# Patient Record
Sex: FEMALE | Race: BLACK OR AFRICAN AMERICAN | NOT HISPANIC OR LATINO | URBAN - METROPOLITAN AREA
[De-identification: names, ages, dates, MRNs, and addresses within clinical notes are randomized per-mention and may not be internally consistent; named-entity substitution may affect disease eponyms.]

---

## 2021-11-24 ENCOUNTER — EMERGENCY (EMERGENCY)
Facility: HOSPITAL | Age: 41
LOS: 0 days | Discharge: HOME | End: 2021-11-24
Attending: EMERGENCY MEDICINE
Payer: MEDICAID

## 2021-11-24 VITALS
HEART RATE: 102 BPM | TEMPERATURE: 98 F | OXYGEN SATURATION: 99 % | DIASTOLIC BLOOD PRESSURE: 91 MMHG | RESPIRATION RATE: 18 BRPM | HEIGHT: 59 IN | SYSTOLIC BLOOD PRESSURE: 140 MMHG | WEIGHT: 108.03 LBS

## 2021-11-24 DIAGNOSIS — H53.8 OTHER VISUAL DISTURBANCES: ICD-10-CM

## 2021-11-24 DIAGNOSIS — R51.9 HEADACHE, UNSPECIFIED: ICD-10-CM

## 2021-11-24 DIAGNOSIS — M79.671 PAIN IN RIGHT FOOT: ICD-10-CM

## 2021-11-24 DIAGNOSIS — I10 ESSENTIAL (PRIMARY) HYPERTENSION: ICD-10-CM

## 2021-11-24 DIAGNOSIS — Z59.00 HOMELESSNESS UNSPECIFIED: ICD-10-CM

## 2021-11-24 DIAGNOSIS — M79.672 PAIN IN LEFT FOOT: ICD-10-CM

## 2021-11-24 PROCEDURE — 99283 EMERGENCY DEPT VISIT LOW MDM: CPT

## 2021-11-24 PROCEDURE — 99284 EMERGENCY DEPT VISIT MOD MDM: CPT

## 2021-11-24 RX ORDER — IBUPROFEN 200 MG
600 TABLET ORAL ONCE
Refills: 0 | Status: COMPLETED | OUTPATIENT
Start: 2021-11-24 | End: 2021-11-24

## 2021-11-24 RX ORDER — ACETAMINOPHEN 500 MG
1000 TABLET ORAL ONCE
Refills: 0 | Status: COMPLETED | OUTPATIENT
Start: 2021-11-24 | End: 2021-11-24

## 2021-11-24 RX ADMIN — Medication 600 MILLIGRAM(S): at 12:47

## 2021-11-24 RX ADMIN — Medication 1000 MILLIGRAM(S): at 12:47

## 2021-11-24 SDOH — ECONOMIC STABILITY - HOUSING INSECURITY: HOMELESSNESS UNSPECIFIED: Z59.00
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
## 2021-11-24 NOTE — ED STATDOCS - PROGRESS NOTE DETAILS
42 y/o F with PMH of HTN, asthma presents with HA and "not feeling well." Pt resides in shelter, left yesterday and started feeling HA while waiting for her  outside. Did not take medications for headache. Admits to blurry vision, but states she is supposed to wear glasses. Denies fever, chills, head trauma, known sick contacts. nausea, vomiting. +lightheadedness. PE: Well appearing. HEENT; PERRLA, EOMI. Cardiac: s1s2, RRR. Lungs: CTAB. Abdomen: NBS x4, soft, nontender. Neuro; CN ii-xii intact. sensation intact to light touch in all extremities. 5/5 strength in all extremities. romberg; negative. A/P; 1)headache, will provide motrin & tylenol. 2) homelessness. will d/w social work. - Jean Carlos Scott PA-C SW arranged for placement at [facility]. - Jean Carlos Scott PA-C

## 2021-11-24 NOTE — ED STATDOCS - PATIENT PORTAL LINK FT
You can access the FollowMyHealth Patient Portal offered by [facility] by registering at the following website: [email or web address]. By joining [employer]’s FollowMyHealth portal, you will also be able to view your health information using other applications (apps) compatible with our system.

## 2021-11-24 NOTE — ED STATDOCS - CLINICAL SUMMARY MEDICAL DECISION MAKING FREE TEXT BOX
will provide Tylenol discuss with social work for housing placement and dc home. no red flags for dangerous cause of HA. suspect pt is here more so for housing issues.

## 2021-11-24 NOTE — ED ADULT TRIAGE NOTE - TEMPERATURE IN FAHRENHEIT (DEGREES F)
Left VM for pt to call back with her BG readings. She never faxed them over and hasn't returned call. Will stop following at this point.   97.7

## 2021-11-24 NOTE — ED ADULT NURSE NOTE - CAS DISCH CONDITION
"This is a 30-year-old who returns to follow-up on left first great toe pain.  She continues to wear sandals does report that the pain has diminished.  She is interested to get results from x-ray and lab.  She says that she does have running shoes and apparently also has orthotic inserts although is not convincing that she is necessarily using these as had been recommended last week.    When asked if she has any other concerns she reports that she has a tender lump on her scalp.  She had a previous similar lesion on her scalp when she lived in [place] and this was drained and that solved the problem.  She does not notice any discharge from it but says it is tender when pressed upon.    Objective:  BP 94/62  Pulse 59  Temp 98  F (36.7  C)  Resp 18  Ht 4' 11\" (149.9 cm)  Wt 139 lb 9.6 oz (63.3 kg)  LMP 05/22/2018 (Exact Date)  BMI 28.2 kg/m2  Her vitals are satisfactory.  We reviewed her labs and radiology report and communicated the findings to her.  On palpation of her scalp she has a small tender one problem approximately one half dime sized mobile firm smooth symmetric lump which is tender.  Clinically this appears to be a sebaceous cyst.  She also identifies an area where a similar lesion had been presently where what remains appears to be scar.    Sampson was seen today for recheck.    Diagnoses and all orders for this visit:    Pain of toe of left foot    Sebaceous cyst      I advised her that there is no obvious cause of concern for her left toe pain and recommended wearing supportive footwear and inserts if she is standing or walking a lot.    Concerning the presumed sebaceous cyst I advised her this could be excised which would be both therapeutic and diagnostic to confirm its nature.  On exam it does not appear to be fixed to the skull and I can verify that it is mobile and moves over the skull and therefore should be amenable to sending for pathology.  She will consider whether she wants to pursue this or " not.  In the interim she will use ibuprofen as needed pain.    Total visit time was 25 mins, all of which was face to face MD time, and over 50% of this time was spent in counseling and coordination of care.     Stable
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
## 2021-11-26 ENCOUNTER — EMERGENCY (EMERGENCY)
Facility: HOSPITAL | Age: 41
LOS: 1 days | Discharge: HOME | End: 2021-11-26
Attending: EMERGENCY MEDICINE
Payer: MEDICAID

## 2021-11-26 VITALS
RESPIRATION RATE: 17 BRPM | HEIGHT: 60 IN | HEART RATE: 97 BPM | OXYGEN SATURATION: 99 % | TEMPERATURE: 98 F | WEIGHT: 100.09 LBS | DIASTOLIC BLOOD PRESSURE: 96 MMHG | SYSTOLIC BLOOD PRESSURE: 144 MMHG

## 2021-11-26 PROCEDURE — 99284 EMERGENCY DEPT VISIT MOD MDM: CPT

## 2021-11-26 PROCEDURE — 99284 EMERGENCY DEPT VISIT MOD MDM: CPT | Mod: 25

## 2021-11-26 PROCEDURE — 82962 GLUCOSE BLOOD TEST: CPT

## 2021-11-26 PROCEDURE — 70450 CT HEAD/BRAIN W/O DYE: CPT | Mod: MD

## 2021-11-26 PROCEDURE — 70450 CT HEAD/BRAIN W/O DYE: CPT | Mod: 26,MD

## 2021-11-26 RX ORDER — ACETAMINOPHEN 500 MG
650 TABLET ORAL ONCE
Refills: 0 | Status: COMPLETED | OUTPATIENT
Start: 2021-11-26 | End: 2021-11-26

## 2021-11-26 RX ADMIN — Medication 650 MILLIGRAM(S): at 14:52

## 2021-11-26 RX ADMIN — Medication 650 MILLIGRAM(S): at 18:29

## 2021-11-26 NOTE — ED ADULT NURSE NOTE - OBJECTIVE STATEMENT
41y female c/o headache and numbness/tingling in fingertips. pt also voicing concerns of possibility of diabetes diagnosis. FS WNL. denies vision changes. respirations even and unlabored. denies chest discomfort. abd soft and nondistended, denies nausea/vomiting. skin WNL for race. pt requesting to speak with social work regarding housing issues.

## 2021-11-26 NOTE — ED PROVIDER NOTE - PATIENT PORTAL LINK FT
You can access the FollowMyHealth Patient Portal offered by [facility] by registering at the following website: [email or web address]. By joining [employer]’s FollowMyHealth portal, you will also be able to view your health information using other applications (apps) compatible with our system.

## 2021-11-26 NOTE — CHART NOTE - NSCHARTNOTEFT_GEN_A_CORE
ADELANotsebastián: pt ready for d/c .DSS placed pt at [facility] shelter  in [place]  ,worker called [facility] to request taxi ([employer] ) was told pt's transport. benefit inactive. Worker called DSS encouraged worker to call [facility] care supervisor to approve trip for pt . [facility] called again(Alfonso) supervisor requested . Spoke with supervisor Phyllis who states she is unable to assist given that the eligibility department is the one in charge of reactivation and they are not available until Monday am.

## 2021-11-26 NOTE — ED PROVIDER NOTE - CLINICAL SUMMARY MEDICAL DECISION MAKING FREE TEXT BOX
nontoxic appearing female no acute distress eating candy in ER reporting headache and requesting to speak with SW regarding housing. mildly hypertensive. reports compliance on medication. no focal neuro deficits. accucheck stable and eating in ED. SW made aware

## 2021-11-26 NOTE — ED PROVIDER NOTE - ATTENDING CONTRIBUTION TO CARE
The patient seen and examined.    Headache    I, Riki Oreilly, performed the initial face to face bedside interview with this patient regarding history of present illness, review of symptoms and relevant past medical, social and family history.  I completed an independent physical examination.  I was the initial provider who evaluated this patient. I have signed out the follow up of any pending tests (i.e. labs, radiological studies) to the ACP.  I have communicated the patient’s plan of care and disposition with the ACP.

## 2021-11-26 NOTE — ED PROVIDER NOTE - PHYSICAL EXAMINATION
Const: Awake, alert and oriented. In no acute distress. Well appearing.  HEENT: NC/AT. Moist mucous membranes.  Eyes: No scleral icterus. EOMI. finger to nose intake peripheral fields intact   Neck:. Soft and supple. Full ROM without pain.  Cardiac: Regular rate and regular rhythm. +S1/S2. No murmurs. Peripheral pulses 2+ and symmetric. No LE edema.  Resp: Speaking in full sentences. No evidence of respiratory distress. No wheezes, rales or rhonchi.  Abd: Soft, non-tender, non-distended. . No guarding or rebound.  Back: Spine midline and non-tender. No CVAT.  Skin: No rashes, abrasions or lacerations.  Lymph: No cervical lymphadenopathy.  Neuro: Awake, alert & oriented x 3. Moves all extremities symmetrically.  FEET: no open sores lesions abrasions lacerations or rash. hypersentitive to touch. FROM of bilateral toes ankle. ambulatory

## 2021-11-26 NOTE — ED PROVIDER NOTE - OBJECTIVE STATEMENT
42 yo female hx of asthma htn and peripheral neuropathy, presenting to the ED with headache no visual changes no head injury associated with some mild dizziness but states that she thinks its related to her not eating a well balanced meals, also reporting and pain in feet , but walking a lot more than usual. pt also requesting to speak with social work regarding housing issues. wants to be placed with her . reports that he helps her with her helath issues. pt states that she is "mostly good" at taking her medications.

## 2021-11-26 NOTE — ED PROVIDER NOTE - PROGRESS NOTE DETAILS
RBADY MCCARTNEY: tolerating po sour patch kids   no distress.   social work aware POLO- social made aware ct head is negative, will arrange for taxi

## 2021-12-23 ENCOUNTER — EMERGENCY (EMERGENCY)
Facility: HOSPITAL | Age: 41
LOS: 1 days | Discharge: HOME | End: 2021-12-23
Attending: EMERGENCY MEDICINE
Payer: MEDICAID

## 2021-12-23 VITALS
SYSTOLIC BLOOD PRESSURE: 148 MMHG | RESPIRATION RATE: 18 BRPM | OXYGEN SATURATION: 98 % | TEMPERATURE: 98 F | DIASTOLIC BLOOD PRESSURE: 99 MMHG | HEIGHT: 60 IN | WEIGHT: 115.08 LBS | HEART RATE: 87 BPM

## 2021-12-23 LAB
ALBUMIN SERPL ELPH-MCNC: 4.8 G/DL — SIGNIFICANT CHANGE UP (ref 3.3–5.2)
ALP SERPL-CCNC: 82 U/L — SIGNIFICANT CHANGE UP (ref 40–120)
ALT FLD-CCNC: 15 U/L — SIGNIFICANT CHANGE UP
ANION GAP SERPL CALC-SCNC: 16 MMOL/L — SIGNIFICANT CHANGE UP (ref 5–17)
APTT BLD: 30.5 SEC — SIGNIFICANT CHANGE UP (ref 27.5–35.5)
AST SERPL-CCNC: 21 U/L — SIGNIFICANT CHANGE UP
BASOPHILS # BLD AUTO: 0.11 K/UL — SIGNIFICANT CHANGE UP (ref 0–0.2)
BASOPHILS NFR BLD AUTO: 1.7 % — SIGNIFICANT CHANGE UP (ref 0–2)
BILIRUB SERPL-MCNC: 0.3 MG/DL — LOW (ref 0.4–2)
BUN SERPL-MCNC: 10.4 MG/DL — SIGNIFICANT CHANGE UP (ref 8–20)
CALCIUM SERPL-MCNC: 9.8 MG/DL — SIGNIFICANT CHANGE UP (ref 8.6–10.2)
CHLORIDE SERPL-SCNC: 108 MMOL/L — HIGH (ref 98–107)
CO2 SERPL-SCNC: 21 MMOL/L — LOW (ref 22–29)
CREAT SERPL-MCNC: 0.7 MG/DL — SIGNIFICANT CHANGE UP (ref 0.5–1.3)
EOSINOPHIL # BLD AUTO: 0.2 K/UL — SIGNIFICANT CHANGE UP (ref 0–0.5)
EOSINOPHIL NFR BLD AUTO: 3.1 % — SIGNIFICANT CHANGE UP (ref 0–6)
GLUCOSE SERPL-MCNC: 73 MG/DL — SIGNIFICANT CHANGE UP (ref 70–99)
HCG SERPL-ACNC: <4 MIU/ML — SIGNIFICANT CHANGE UP
HCT VFR BLD CALC: 38.9 % — SIGNIFICANT CHANGE UP (ref 34.5–45)
HGB BLD-MCNC: 12.3 G/DL — SIGNIFICANT CHANGE UP (ref 11.5–15.5)
IMM GRANULOCYTES NFR BLD AUTO: 0.3 % — SIGNIFICANT CHANGE UP (ref 0–1.5)
INR BLD: 1.05 RATIO — SIGNIFICANT CHANGE UP (ref 0.88–1.16)
LYMPHOCYTES # BLD AUTO: 2.3 K/UL — SIGNIFICANT CHANGE UP (ref 1–3.3)
LYMPHOCYTES # BLD AUTO: 35.3 % — SIGNIFICANT CHANGE UP (ref 13–44)
MAGNESIUM SERPL-MCNC: 2 MG/DL — SIGNIFICANT CHANGE UP (ref 1.6–2.6)
MCHC RBC-ENTMCNC: 26.8 PG — LOW (ref 27–34)
MCHC RBC-ENTMCNC: 31.6 GM/DL — LOW (ref 32–36)
MCV RBC AUTO: 84.7 FL — SIGNIFICANT CHANGE UP (ref 80–100)
MONOCYTES # BLD AUTO: 0.42 K/UL — SIGNIFICANT CHANGE UP (ref 0–0.9)
MONOCYTES NFR BLD AUTO: 6.5 % — SIGNIFICANT CHANGE UP (ref 2–14)
NEUTROPHILS # BLD AUTO: 3.46 K/UL — SIGNIFICANT CHANGE UP (ref 1.8–7.4)
NEUTROPHILS NFR BLD AUTO: 53.1 % — SIGNIFICANT CHANGE UP (ref 43–77)
PLATELET # BLD AUTO: 383 K/UL — SIGNIFICANT CHANGE UP (ref 150–400)
POTASSIUM SERPL-MCNC: 3.8 MMOL/L — SIGNIFICANT CHANGE UP (ref 3.5–5.3)
POTASSIUM SERPL-SCNC: 3.8 MMOL/L — SIGNIFICANT CHANGE UP (ref 3.5–5.3)
PROT SERPL-MCNC: 8.1 G/DL — SIGNIFICANT CHANGE UP (ref 6.6–8.7)
PROTHROM AB SERPL-ACNC: 12.1 SEC — SIGNIFICANT CHANGE UP (ref 10.6–13.6)
RBC # BLD: 4.59 M/UL — SIGNIFICANT CHANGE UP (ref 3.8–5.2)
RBC # FLD: 15.7 % — HIGH (ref 10.3–14.5)
SODIUM SERPL-SCNC: 145 MMOL/L — SIGNIFICANT CHANGE UP (ref 135–145)
TROPONIN T SERPL-MCNC: <0.01 NG/ML — SIGNIFICANT CHANGE UP (ref 0–0.06)
WBC # BLD: 6.51 K/UL — SIGNIFICANT CHANGE UP (ref 3.8–10.5)
WBC # FLD AUTO: 6.51 K/UL — SIGNIFICANT CHANGE UP (ref 3.8–10.5)

## 2021-12-23 PROCEDURE — 84702 CHORIONIC GONADOTROPIN TEST: CPT

## 2021-12-23 PROCEDURE — 85025 COMPLETE CBC W/AUTO DIFF WBC: CPT

## 2021-12-23 PROCEDURE — 80053 COMPREHEN METABOLIC PANEL: CPT

## 2021-12-23 PROCEDURE — 84484 ASSAY OF TROPONIN QUANT: CPT

## 2021-12-23 PROCEDURE — 71045 X-RAY EXAM CHEST 1 VIEW: CPT | Mod: 26

## 2021-12-23 PROCEDURE — 36415 COLL VENOUS BLD VENIPUNCTURE: CPT

## 2021-12-23 PROCEDURE — 93005 ELECTROCARDIOGRAM TRACING: CPT

## 2021-12-23 PROCEDURE — 99284 EMERGENCY DEPT VISIT MOD MDM: CPT | Mod: 25

## 2021-12-23 PROCEDURE — 99285 EMERGENCY DEPT VISIT HI MDM: CPT

## 2021-12-23 PROCEDURE — 71045 X-RAY EXAM CHEST 1 VIEW: CPT

## 2021-12-23 PROCEDURE — 93010 ELECTROCARDIOGRAM REPORT: CPT

## 2021-12-23 PROCEDURE — 83735 ASSAY OF MAGNESIUM: CPT

## 2021-12-23 PROCEDURE — 85610 PROTHROMBIN TIME: CPT

## 2021-12-23 PROCEDURE — 85730 THROMBOPLASTIN TIME PARTIAL: CPT

## 2021-12-23 PROCEDURE — 96374 THER/PROPH/DIAG INJ IV PUSH: CPT

## 2021-12-23 RX ORDER — KETOROLAC TROMETHAMINE 30 MG/ML
15 SYRINGE (ML) INJECTION ONCE
Refills: 0 | Status: DISCONTINUED | OUTPATIENT
Start: 2021-12-23 | End: 2021-12-23

## 2021-12-23 RX ORDER — GABAPENTIN 400 MG/1
1 CAPSULE ORAL
Qty: 30 | Refills: 0
Start: 2021-12-23 | End: 2022-01-06

## 2021-12-23 RX ORDER — GABAPENTIN 400 MG/1
300 CAPSULE ORAL ONCE
Refills: 0 | Status: COMPLETED | OUTPATIENT
Start: 2021-12-23 | End: 2021-12-23

## 2021-12-23 RX ADMIN — GABAPENTIN 300 MILLIGRAM(S): 400 CAPSULE ORAL at 18:48

## 2021-12-23 RX ADMIN — Medication 15 MILLIGRAM(S): at 18:48

## 2021-12-23 NOTE — ED ADULT NURSE NOTE - NSHOSCREENINGQ1_ED_ALL_ED
No Imiquimod Counseling:  I discussed with the patient the risks of imiquimod including but not limited to erythema, scaling, itching, weeping, crusting, and pain.  Patient understands that the inflammatory response to imiquimod is variable from person to person and was educated regarded proper titration schedule.  If flu-like symptoms develop, patient knows to discontinue the medication and contact us.

## 2021-12-23 NOTE — ED ADULT NURSE REASSESSMENT NOTE - NS ED NURSE REASSESS COMMENT FT1
Pt with no complaints for RN at this time Denies any chest pain, shortness of breath, nausea or dizziness pt educated on plan of care, pt able to successfully teach back plan of care to RN, RN will continue to reeducate pt during hospital stay.

## 2021-12-23 NOTE — ED PROVIDER NOTE - PATIENT PORTAL LINK FT
You can access the FollowMyHealth Patient Portal offered by [facility] by registering at the following website: [email or web address]. By joining [employer]’s FollowMyHealth portal, you will also be able to view your health information using other applications (apps) compatible with our system.

## 2021-12-23 NOTE — ED PROVIDER NOTE - OBJECTIVE STATEMENT
41 year old female with PMH peripheral neuropath presents with diffuse pain. Pt states that the pain is burning, wors ein b/l hands and feet but diffuse, includin in her chest, not related to exertion but worse with touching. No OB, cough, fevers, chills.

## 2021-12-23 NOTE — ED ADULT NURSE NOTE - OBJECTIVE STATEMENT
Pt A&Ox4, NAD. Pt presents to the ED with complaints of CP. Breathing even and unlabored. Skin warm, dry, color appropriate to race. Pt placed on CM. ED workup in progress.

## 2021-12-24 NOTE — ED POST DISCHARGE NOTE - ADDITIONAL DOCUMENTATION
BRADY Kramer contacted by Radiology that pt has complicated Xray read and neds return to revelaution, Pt  contacted states that pt does not have a phone and does not have a address for her at this time he states that he is able to contact her he was informed to tell her to return to the ED.

## 2021-12-24 NOTE — ED POST DISCHARGE NOTE - DETAILS
Pt informed of complicated condition and need to return to the ED for further evaluation to eval for possible life and limb threatening conditions,

## 2022-01-04 ENCOUNTER — EMERGENCY (EMERGENCY)
Facility: HOSPITAL | Age: 42
LOS: 1 days | Discharge: HOME | End: 2022-01-04
Attending: EMERGENCY MEDICINE
Payer: MEDICAID

## 2022-01-04 VITALS
DIASTOLIC BLOOD PRESSURE: 100 MMHG | SYSTOLIC BLOOD PRESSURE: 152 MMHG | WEIGHT: 104.94 LBS | HEIGHT: 60 IN | TEMPERATURE: 97 F | HEART RATE: 104 BPM | RESPIRATION RATE: 18 BRPM | OXYGEN SATURATION: 100 %

## 2022-01-04 LAB
ALBUMIN SERPL ELPH-MCNC: 4.7 G/DL — SIGNIFICANT CHANGE UP (ref 3.3–5.2)
ALP SERPL-CCNC: 80 U/L — SIGNIFICANT CHANGE UP (ref 40–120)
ALT FLD-CCNC: 13 U/L — SIGNIFICANT CHANGE UP
ANION GAP SERPL CALC-SCNC: 17 MMOL/L — SIGNIFICANT CHANGE UP (ref 5–17)
AST SERPL-CCNC: 16 U/L — SIGNIFICANT CHANGE UP
BASOPHILS # BLD AUTO: 0.07 K/UL — SIGNIFICANT CHANGE UP (ref 0–0.2)
BASOPHILS NFR BLD AUTO: 1.1 % — SIGNIFICANT CHANGE UP (ref 0–2)
BILIRUB SERPL-MCNC: 0.6 MG/DL — SIGNIFICANT CHANGE UP (ref 0.4–2)
BUN SERPL-MCNC: 10.1 MG/DL — SIGNIFICANT CHANGE UP (ref 8–20)
CALCIUM SERPL-MCNC: 9.3 MG/DL — SIGNIFICANT CHANGE UP (ref 8.6–10.2)
CHLORIDE SERPL-SCNC: 101 MMOL/L — SIGNIFICANT CHANGE UP (ref 98–107)
CO2 SERPL-SCNC: 18 MMOL/L — LOW (ref 22–29)
CREAT SERPL-MCNC: 0.81 MG/DL — SIGNIFICANT CHANGE UP (ref 0.5–1.3)
EOSINOPHIL # BLD AUTO: 0.18 K/UL — SIGNIFICANT CHANGE UP (ref 0–0.5)
EOSINOPHIL NFR BLD AUTO: 2.7 % — SIGNIFICANT CHANGE UP (ref 0–6)
GLUCOSE SERPL-MCNC: 85 MG/DL — SIGNIFICANT CHANGE UP (ref 70–99)
HCT VFR BLD CALC: 38.9 % — SIGNIFICANT CHANGE UP (ref 34.5–45)
HGB BLD-MCNC: 12.3 G/DL — SIGNIFICANT CHANGE UP (ref 11.5–15.5)
IMM GRANULOCYTES NFR BLD AUTO: 0.3 % — SIGNIFICANT CHANGE UP (ref 0–1.5)
LYMPHOCYTES # BLD AUTO: 2.21 K/UL — SIGNIFICANT CHANGE UP (ref 1–3.3)
LYMPHOCYTES # BLD AUTO: 33.2 % — SIGNIFICANT CHANGE UP (ref 13–44)
MCHC RBC-ENTMCNC: 26.2 PG — LOW (ref 27–34)
MCHC RBC-ENTMCNC: 31.6 GM/DL — LOW (ref 32–36)
MCV RBC AUTO: 82.8 FL — SIGNIFICANT CHANGE UP (ref 80–100)
MONOCYTES # BLD AUTO: 0.36 K/UL — SIGNIFICANT CHANGE UP (ref 0–0.9)
MONOCYTES NFR BLD AUTO: 5.4 % — SIGNIFICANT CHANGE UP (ref 2–14)
NEUTROPHILS # BLD AUTO: 3.82 K/UL — SIGNIFICANT CHANGE UP (ref 1.8–7.4)
NEUTROPHILS NFR BLD AUTO: 57.3 % — SIGNIFICANT CHANGE UP (ref 43–77)
PLATELET # BLD AUTO: 390 K/UL — SIGNIFICANT CHANGE UP (ref 150–400)
POTASSIUM SERPL-MCNC: 4 MMOL/L — SIGNIFICANT CHANGE UP (ref 3.5–5.3)
POTASSIUM SERPL-SCNC: 4 MMOL/L — SIGNIFICANT CHANGE UP (ref 3.5–5.3)
PROT SERPL-MCNC: 8.3 G/DL — SIGNIFICANT CHANGE UP (ref 6.6–8.7)
RAPID RVP RESULT: SIGNIFICANT CHANGE UP
RBC # BLD: 4.7 M/UL — SIGNIFICANT CHANGE UP (ref 3.8–5.2)
RBC # FLD: 15.2 % — HIGH (ref 10.3–14.5)
SARS-COV-2 RNA SPEC QL NAA+PROBE: SIGNIFICANT CHANGE UP
SODIUM SERPL-SCNC: 136 MMOL/L — SIGNIFICANT CHANGE UP (ref 135–145)
WBC # BLD: 6.66 K/UL — SIGNIFICANT CHANGE UP (ref 3.8–10.5)
WBC # FLD AUTO: 6.66 K/UL — SIGNIFICANT CHANGE UP (ref 3.8–10.5)

## 2022-01-04 PROCEDURE — 71250 CT THORAX DX C-: CPT | Mod: 26,MG

## 2022-01-04 PROCEDURE — G1004: CPT

## 2022-01-04 PROCEDURE — 80053 COMPREHEN METABOLIC PANEL: CPT

## 2022-01-04 PROCEDURE — 99284 EMERGENCY DEPT VISIT MOD MDM: CPT | Mod: 25

## 2022-01-04 PROCEDURE — 36415 COLL VENOUS BLD VENIPUNCTURE: CPT

## 2022-01-04 PROCEDURE — 86480 TB TEST CELL IMMUN MEASURE: CPT

## 2022-01-04 PROCEDURE — 85025 COMPLETE CBC W/AUTO DIFF WBC: CPT

## 2022-01-04 PROCEDURE — 99285 EMERGENCY DEPT VISIT HI MDM: CPT

## 2022-01-04 PROCEDURE — 0225U NFCT DS DNA&RNA 21 SARSCOV2: CPT

## 2022-01-04 PROCEDURE — 71250 CT THORAX DX C-: CPT | Mod: MG

## 2022-01-04 RX ORDER — GABAPENTIN 400 MG/1
300 CAPSULE ORAL ONCE
Refills: 0 | Status: COMPLETED | OUTPATIENT
Start: 2022-01-04 | End: 2022-01-04

## 2022-01-04 RX ORDER — ACETAMINOPHEN 500 MG
975 TABLET ORAL ONCE
Refills: 0 | Status: COMPLETED | OUTPATIENT
Start: 2022-01-04 | End: 2022-01-04

## 2022-01-04 RX ORDER — AMLODIPINE BESYLATE 2.5 MG/1
1 TABLET ORAL
Qty: 14 | Refills: 0
Start: 2022-01-04 | End: 2022-01-17

## 2022-01-04 RX ORDER — IBUPROFEN 200 MG
1 TABLET ORAL
Qty: 21 | Refills: 0
Start: 2022-01-04 | End: 2022-01-10

## 2022-01-04 RX ORDER — GABAPENTIN 400 MG/1
1 CAPSULE ORAL
Qty: 28 | Refills: 0
Start: 2022-01-04 | End: 2022-01-17

## 2022-01-04 RX ORDER — AMLODIPINE BESYLATE 2.5 MG/1
5 TABLET ORAL ONCE
Refills: 0 | Status: COMPLETED | OUTPATIENT
Start: 2022-01-04 | End: 2022-01-04

## 2022-01-04 RX ADMIN — AMLODIPINE BESYLATE 5 MILLIGRAM(S): 2.5 TABLET ORAL at 18:45

## 2022-01-04 RX ADMIN — GABAPENTIN 300 MILLIGRAM(S): 400 CAPSULE ORAL at 18:45

## 2022-01-04 RX ADMIN — Medication 975 MILLIGRAM(S): at 22:43

## 2022-01-04 NOTE — ED PROVIDER NOTE - PATIENT PORTAL LINK FT
You can access the FollowMyHealth Patient Portal offered by [facility] by registering at the following website: [email or web address]. By joining [employer]’s FollowMyHealth portal, you will also be able to view your health information using other applications (apps) compatible with our system.

## 2022-01-04 NOTE — ED PROVIDER NOTE - NSFOLLOWUPINSTRUCTIONS_ED_ALL_ED_FT
Peripheral Neuropathy    WHAT YOU NEED TO KNOW:    Peripheral neuropathy is a condition that affects nerves in your arms, legs, hands, or feet. It also may affect your organs, such as your lungs, stomach, bladder, or genitals. Peripheral neuropathy can affect the nerves that allow you to move or to feel objects. It also may affect nerves that control your body functions, such as digestion or urination. This condition may go away on its own, or you may always have it.    DISCHARGE INSTRUCTIONS:    Call your local emergency number (911 in the ) if:   •You cannot walk at all.          Return to the emergency department if:   •You fall.          Call your doctor or neurologist if:   •Your pain is severe.      •You cannot control your bladder.      •You have trouble having sex.      •You have questions or concerns about your condition or care.      Medicines: You may need any of the following:  •Prescription pain medicine may be given. Ask your healthcare provider how to take this medicine safely. Some prescription pain medicines contain acetaminophen. Do not take other medicines that contain acetaminophen without talking to your healthcare provider. Too much acetaminophen may cause liver damage. Prescription pain medicine may cause constipation. Ask your healthcare provider how to prevent or treat constipation.       •Antidepressants also help decrease pain.      •Antiseizure medicine also helps with nerve pain.      •Take your medicine as directed. Contact your healthcare provider if you think your medicine is not helping or if you have side effects. Tell him of her if you are allergic to any medicine. Keep a list of the medicines, vitamins, and herbs you take. Include the amounts, and when and why you take them. Bring the list or the pill bottles to follow-up visits. Carry your medicine list with you in case of an emergency.      Manage peripheral neuropathy:   •Go to physical or occupational therapy as directed. Physical and occupational therapists may help you exercise your arms, legs, and hands. They may teach you new ways to do things at home.      •Wear a brace or splint, if directed. You may need a device that supports or holds a body part still. For example, if you have carpal tunnel syndrome, you may need to wear a wrist brace.      •Prevent falls. Move with care and stand up slowly. Wear shoes that support your feet, and do not go barefoot. Ask about walking aids, such as a cane or walker. You may want to install railings or nonslip pads in your home, especially in the bathroom. Ask for more information on how to avoid falls.  Fall Prevention for Adults           •Check your skin daily. Sores can form where your skin makes contact with chairs, beds, or other body parts. They also can form under splints. Keep your skin clean, and check your skin daily for sores.      •Exercise as directed. Ask about the best exercise plan for you. Physical activity may increase your balance and strength and may decrease your pain. It is best to start exercising slowly and do more as you get stronger.   FAMILY WALKING FOR EXERCISE           Follow up with your doctor or neurologist as directed: Write down your questions so you remember to ask them during your visits.

## 2022-01-04 NOTE — ED PROVIDER NOTE - CLINICAL SUMMARY MEDICAL DECISION MAKING FREE TEXT BOX
vague symptoms, no fever; +non compliance with meds; will give meds, bvasic labs, quantiferon and CT chest to f/u on xray findings. vague symptoms, no fever; +non compliance with meds; will give meds, bvasic labs, quantiferon and CT chest to f/u on xray findings.    23:15 - feels better, ok for d/c
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
## 2022-01-04 NOTE — ED PROVIDER NOTE - CARE PLAN
1 Principal Discharge DX:	Peripheral neuropathy  Secondary Diagnosis:	Prescription refill  Secondary Diagnosis:	Abnormality of lung on chest x-ray

## 2022-01-06 LAB
GAMMA INTERFERON BACKGROUND BLD IA-ACNC: 0.01 IU/ML — SIGNIFICANT CHANGE UP
M TB IFN-G BLD-IMP: NEGATIVE — SIGNIFICANT CHANGE UP
M TB IFN-G CD4+ BCKGRND COR BLD-ACNC: 0 IU/ML — SIGNIFICANT CHANGE UP
M TB IFN-G CD4+CD8+ BCKGRND COR BLD-ACNC: 0 IU/ML — SIGNIFICANT CHANGE UP
QUANT TB PLUS MITOGEN MINUS NIL: 9.97 IU/ML — SIGNIFICANT CHANGE UP

## 2022-04-05 PROBLEM — I10 ESSENTIAL (PRIMARY) HYPERTENSION: Chronic | Status: ACTIVE | Noted: 2021-11-28

## 2022-04-05 PROBLEM — J45.909 UNSPECIFIED ASTHMA, UNCOMPLICATED: Chronic | Status: INACTIVE | Noted: 2021-11-24 | Resolved: 2021-11-28

## 2022-04-05 PROBLEM — J45.909 UNSPECIFIED ASTHMA, UNCOMPLICATED: Chronic | Status: ACTIVE | Noted: 2021-11-28

## 2022-04-05 PROBLEM — I10 ESSENTIAL (PRIMARY) HYPERTENSION: Chronic | Status: INACTIVE | Noted: 2021-11-24 | Resolved: 2021-11-28

## 2022-09-28 ENCOUNTER — EMERGENCY (EMERGENCY)
Facility: HOSPITAL | Age: 42
LOS: 1 days | Discharge: HOME | End: 2022-09-28
Attending: EMERGENCY MEDICINE
Payer: MEDICAID

## 2022-09-28 VITALS
DIASTOLIC BLOOD PRESSURE: 119 MMHG | TEMPERATURE: 99 F | HEIGHT: 60 IN | OXYGEN SATURATION: 98 % | RESPIRATION RATE: 18 BRPM | HEART RATE: 98 BPM | SYSTOLIC BLOOD PRESSURE: 172 MMHG | WEIGHT: 125 LBS

## 2022-09-28 VITALS
HEART RATE: 94 BPM | SYSTOLIC BLOOD PRESSURE: 177 MMHG | RESPIRATION RATE: 20 BRPM | DIASTOLIC BLOOD PRESSURE: 106 MMHG | OXYGEN SATURATION: 100 %

## 2022-09-28 LAB
ALBUMIN SERPL ELPH-MCNC: 3.7 G/DL — SIGNIFICANT CHANGE UP (ref 3.3–5.2)
ALP SERPL-CCNC: 79 U/L — SIGNIFICANT CHANGE UP (ref 40–120)
ALT FLD-CCNC: 18 U/L — SIGNIFICANT CHANGE UP
ANION GAP SERPL CALC-SCNC: 13 MMOL/L — SIGNIFICANT CHANGE UP (ref 5–17)
AST SERPL-CCNC: 30 U/L — SIGNIFICANT CHANGE UP
BASOPHILS # BLD AUTO: 0.06 K/UL — SIGNIFICANT CHANGE UP (ref 0–0.2)
BASOPHILS NFR BLD AUTO: 1.1 % — SIGNIFICANT CHANGE UP (ref 0–2)
BILIRUB SERPL-MCNC: 0.6 MG/DL — SIGNIFICANT CHANGE UP (ref 0.4–2)
BUN SERPL-MCNC: 5.8 MG/DL — LOW (ref 8–20)
CALCIUM SERPL-MCNC: 8.8 MG/DL — SIGNIFICANT CHANGE UP (ref 8.4–10.5)
CHLORIDE SERPL-SCNC: 102 MMOL/L — SIGNIFICANT CHANGE UP (ref 98–107)
CO2 SERPL-SCNC: 21 MMOL/L — LOW (ref 22–29)
CREAT SERPL-MCNC: 0.76 MG/DL — SIGNIFICANT CHANGE UP (ref 0.5–1.3)
D DIMER BLD IA.RAPID-MCNC: <150 NG/ML DDU — SIGNIFICANT CHANGE UP
EGFR: 99 ML/MIN/1.73M2 — SIGNIFICANT CHANGE UP
EOSINOPHIL # BLD AUTO: 0.4 K/UL — SIGNIFICANT CHANGE UP (ref 0–0.5)
EOSINOPHIL NFR BLD AUTO: 7 % — HIGH (ref 0–6)
GLUCOSE SERPL-MCNC: 96 MG/DL — SIGNIFICANT CHANGE UP (ref 70–99)
HCG SERPL-ACNC: <4 MIU/ML — SIGNIFICANT CHANGE UP
HCT VFR BLD CALC: 37.3 % — SIGNIFICANT CHANGE UP (ref 34.5–45)
HGB BLD-MCNC: 12.3 G/DL — SIGNIFICANT CHANGE UP (ref 11.5–15.5)
IMM GRANULOCYTES NFR BLD AUTO: 0.2 % — SIGNIFICANT CHANGE UP (ref 0–0.9)
LYMPHOCYTES # BLD AUTO: 1.64 K/UL — SIGNIFICANT CHANGE UP (ref 1–3.3)
LYMPHOCYTES # BLD AUTO: 28.8 % — SIGNIFICANT CHANGE UP (ref 13–44)
MCHC RBC-ENTMCNC: 30.1 PG — SIGNIFICANT CHANGE UP (ref 27–34)
MCHC RBC-ENTMCNC: 33 GM/DL — SIGNIFICANT CHANGE UP (ref 32–36)
MCV RBC AUTO: 91.2 FL — SIGNIFICANT CHANGE UP (ref 80–100)
MONOCYTES # BLD AUTO: 0.71 K/UL — SIGNIFICANT CHANGE UP (ref 0–0.9)
MONOCYTES NFR BLD AUTO: 12.5 % — SIGNIFICANT CHANGE UP (ref 2–14)
NEUTROPHILS # BLD AUTO: 2.88 K/UL — SIGNIFICANT CHANGE UP (ref 1.8–7.4)
NEUTROPHILS NFR BLD AUTO: 50.4 % — SIGNIFICANT CHANGE UP (ref 43–77)
NT-PROBNP SERPL-SCNC: 171 PG/ML — SIGNIFICANT CHANGE UP (ref 0–300)
PLATELET # BLD AUTO: 278 K/UL — SIGNIFICANT CHANGE UP (ref 150–400)
POTASSIUM SERPL-MCNC: 3.9 MMOL/L — SIGNIFICANT CHANGE UP (ref 3.5–5.3)
POTASSIUM SERPL-SCNC: 3.9 MMOL/L — SIGNIFICANT CHANGE UP (ref 3.5–5.3)
PROT SERPL-MCNC: 7 G/DL — SIGNIFICANT CHANGE UP (ref 6.6–8.7)
RBC # BLD: 4.09 M/UL — SIGNIFICANT CHANGE UP (ref 3.8–5.2)
RBC # FLD: 16.9 % — HIGH (ref 10.3–14.5)
SODIUM SERPL-SCNC: 136 MMOL/L — SIGNIFICANT CHANGE UP (ref 135–145)
TROPONIN T SERPL-MCNC: <0.01 NG/ML — SIGNIFICANT CHANGE UP (ref 0–0.06)
WBC # BLD: 5.7 K/UL — SIGNIFICANT CHANGE UP (ref 3.8–10.5)
WBC # FLD AUTO: 5.7 K/UL — SIGNIFICANT CHANGE UP (ref 3.8–10.5)

## 2022-09-28 PROCEDURE — 93005 ELECTROCARDIOGRAM TRACING: CPT

## 2022-09-28 PROCEDURE — 36415 COLL VENOUS BLD VENIPUNCTURE: CPT

## 2022-09-28 PROCEDURE — 84702 CHORIONIC GONADOTROPIN TEST: CPT

## 2022-09-28 PROCEDURE — 85379 FIBRIN DEGRADATION QUANT: CPT

## 2022-09-28 PROCEDURE — 99285 EMERGENCY DEPT VISIT HI MDM: CPT | Mod: 25

## 2022-09-28 PROCEDURE — 99285 EMERGENCY DEPT VISIT HI MDM: CPT

## 2022-09-28 PROCEDURE — 85025 COMPLETE CBC W/AUTO DIFF WBC: CPT

## 2022-09-28 PROCEDURE — 71046 X-RAY EXAM CHEST 2 VIEWS: CPT

## 2022-09-28 PROCEDURE — 94640 AIRWAY INHALATION TREATMENT: CPT

## 2022-09-28 PROCEDURE — 83880 ASSAY OF NATRIURETIC PEPTIDE: CPT

## 2022-09-28 PROCEDURE — 80053 COMPREHEN METABOLIC PANEL: CPT

## 2022-09-28 PROCEDURE — 71046 X-RAY EXAM CHEST 2 VIEWS: CPT | Mod: 26

## 2022-09-28 PROCEDURE — 84484 ASSAY OF TROPONIN QUANT: CPT

## 2022-09-28 PROCEDURE — 93010 ELECTROCARDIOGRAM REPORT: CPT

## 2022-09-28 RX ORDER — AMLODIPINE BESYLATE 2.5 MG/1
5 TABLET ORAL ONCE
Refills: 0 | Status: COMPLETED | OUTPATIENT
Start: 2022-09-28 | End: 2022-09-28

## 2022-09-28 RX ORDER — AMLODIPINE BESYLATE 2.5 MG/1
1 TABLET ORAL
Qty: 7 | Refills: 0
Start: 2022-09-28 | End: 2022-10-04

## 2022-09-28 RX ORDER — ALBUTEROL 90 UG/1
2 AEROSOL, METERED ORAL
Qty: 56 | Refills: 0
Start: 2022-09-28 | End: 2022-10-04

## 2022-09-28 RX ORDER — ALBUTEROL 90 UG/1
2 AEROSOL, METERED ORAL ONCE
Refills: 0 | Status: COMPLETED | OUTPATIENT
Start: 2022-09-28 | End: 2022-09-28

## 2022-09-28 RX ADMIN — ALBUTEROL 2 PUFF(S): 90 AEROSOL, METERED ORAL at 11:59

## 2022-09-28 RX ADMIN — AMLODIPINE BESYLATE 5 MILLIGRAM(S): 2.5 TABLET ORAL at 11:59

## 2022-09-28 NOTE — ED PROVIDER NOTE - NS ED ATTENDING STATEMENT MOD
I have seen and examined this patient and fully participated in the care of this patient as the teaching attending.  The service was shared with the JEAN CLAUDE.  I reviewed and verified the documentation and independently performed the documented:

## 2022-09-28 NOTE — ED PROVIDER NOTE - PATIENT PORTAL LINK FT
You can access the FollowMyHealth Patient Portal offered by [facility] by registering at the following website: [email or web address]. By joining [employer]’s FollowMyHealth portal, you will also be able to view your health information using other applications (apps) compatible with our system.

## 2022-09-28 NOTE — ED PROVIDER NOTE - PHYSICAL EXAMINATION
Gen: No acute distress, non toxic. Pt sitting comfortably in bed talking with  at bedside.   HEENT: Mucous membranes moist, pink conjunctivae, EOMI. PERRL. Airway patent, uvula midline. Posterior pharynx w/out erythema, exudate, or tonsillar swelling. No stridor.   CV: RRR, nl s1/s2. No JVD.   Resp: CTAB, normal rate and effort. No wheezes, rhonchi, or crackles.   GI: Abdomen soft, NT, ND. No rebound, no guarding  Neuro: A&O x4, MAEx4. 5/5 str ext x 4. Sensation intact, symmetric throughout. No FND's. Gait intact. CN 2-12 intact.   MSK: FROM UE and LE b/l.   Skin: No rashes, skin intact and well perfused. cap refill <2sec  Vascular: Radial and dorsalis pedal pulses 2+ b/l. No calf ttp or swelling. No LE edema. Gen: No acute distress, non toxic. Pt sitting comfortably in bed talking with  at bedside.   HEENT: Mucous membranes moist, pink conjunctivae, EOMI. PERRL. Airway patent, uvula midline. Posterior pharynx w/out erythema, exudate, or tonsillar swelling. No stridor.   CV: RRR, nl s1/s2. No JVD.   Resp: CTAB, normal rate and effort. No wheezes, rhonchi, or crackles. No retractions, accessory muscle use, or nasal flaring. Pt speaking in full sentences w/out distress.   GI: Abdomen soft, NT, ND. No rebound, no guarding  Neuro: A&O x4, MAEx4. 5/5 str ext x 4. Sensation intact, symmetric throughout. No FND's. Gait intact. CN 2-12 intact.   MSK: FROM UE and LE b/l.   Skin: No rashes, skin intact and well perfused. cap refill <2sec  Vascular: Radial and dorsalis pedal pulses 2+ b/l. No calf ttp or swelling. No LE edema.

## 2022-09-28 NOTE — ED ADULT TRIAGE NOTE - CHIEF COMPLAINT QUOTE
went on a walk and became SOB. ran out of her inhaler and BP medication. pt has hx of asthma. denies Chest pain

## 2022-09-28 NOTE — ED PROVIDER NOTE - CLINICAL SUMMARY MEDICAL DECISION MAKING FREE TEXT BOX
43 yo female with pmhx of mild persistent asthma, HTN and neuropathy presents with chest tightness and shortness of breath this morning in the setting of running out inhaler. Pt also noted with elevated BP in ER secondary to running out of BP meds. Pt states she hasn't been able to get appt with PCP. EKG, meds, xray, and labs performed. Pt most likely with asthma exacerbation. Upon reassessment, pt's symptoms have resolved. Strict return precautions given. Instructed f/u with PCP and cardio.

## 2022-09-28 NOTE — ED PROVIDER NOTE - ATTENDING CONTRIBUTION TO CARE
42yoF; with PMH signif for Asthma, HTN, Neuropathy; now p/w shortness of breath over past 3 days 2/2 to being unable to refill her inhaler.  c/o chest tightness. denies palpitations. denies dizziness. denies abd pain. denies n/v/d. denies dizziness. denies diaphoresis.  also reports BP being high for past few days because she has not had her amlodipine in past 3 days as well.  General:     NAD  Head:     NC/AT, EOMI, oral mucosa moist  Neck:     trachea midline  Lungs:     CTA b/l, no w/r/r  CVS:     S1S2, RRR, no m/g/r  Abd:     +BS, s/nt/nd, no organomegaly  Ext:    2+ radial and pedal pulses, no c/c/e  Neuro: AAOx3, no sensory/motor deficits  A/P:  42yoF p/w asthma exacerbation and hypertensive urgency  -hypertensive urgency: ekg, labs, cardiac monitor  -asthma: inhaler, xray, re-eval

## 2022-09-28 NOTE — ED PROVIDER NOTE - OBJECTIVE STATEMENT
43 yo female with pmhx of mild persistent asthma, HTN and neuropathy presents with chest tightness and shortness of breath this morning. Pt states she was walking to the store with her  and during their walk, felt short of breath and developed a tightness in her chest, states this feels similar to her asthma exacerbations. Reports she ran out of her albuterol inhaler yesterday so wasn't able to take it.  Denies ever being intubated bc of asthma but states might've been admitted to the hospital years ago for asthma tx.    Denies recent travel, recent sx's immobilization, or OCP use.   Denies fever, chills, body aches, LOC, abd pain, N/V/C/D, dysuria, frequency, hematuria, paresthesias in the extremities, rashes. 41 yo female with pmhx of mild persistent asthma, HTN and neuropathy presents with chest tightness and shortness of breath this morning. Pt states she was walking to the store with her  and during their walk, felt short of breath and developed a tightness in her chest, states this feels similar to her asthma exacerbations. Reports she ran out of her albuterol inhaler yesterday so wasn't able to take it. Pt says she was able to catch her breath again after she rest for 5 minutes and drank soda. Pt states this feels similar to her asthma exacerbations. Pt was going to wait until she saw her pcp on 10/5 for refill on her albuterol but didn't want this to happen again so she came here. Denies ever being intubated bc of asthma but states might've been admitted to the hospital years ago for asthma tx. Denies recent travel, recent sx's immobilization, or OCP use. Pt states she has a hx of HTN, on amlodipine 5 mg qd, however pt ran out of her meds 2 days ago so hasn't been taking it. Denies H/A, chest pain, back pain, weakness, or numbness. Denies fever, chills, body aches, LOC, abd pain, N/V/C/D, dysuria, frequency, hematuria, paresthesias in the extremities, rashes.

## 2022-09-28 NOTE — ED PROVIDER NOTE - PROGRESS NOTE DETAILS
Pt reassessed. Pt ran out of amlodipine 2 days ago and doesn't have f/u appt until 10/5. Given her dose of amlodipine here. Upon intial assessment, BP was 220/112, HR: 102, up on reassessment and after amlodipine 175/102, HR: 89. Pt informed of elevated BP and the need for further follow up to PCP for management has no CP, SOB, diff breathing, cough, ABD pain, dizziness, change in vision, back pain, HA. PT educated that at this time there is no indication for emergent management of BP, and the importance ot BP control over long term due to potential for long term complication from uncontrolled HTN. PT verbalizes that they understand all information given to them. Explained need to f/u with pcp and cardiologist within 1-2 days.   Pt states she feels better and her chest tightness/SOB has improved significantly since being here. Pt reassessed, pt feeling better at this time, vss, pt able to walk, talk and vocalized plan of action. Discussed in depth and explained to pt in depth the next steps that need to be taking including proper follow up with PCP or specialists. All findings were discussed with pt as well. Pt verbalized their concerns and all questions were answered. Pt understands dispo and wants discharge. Given good instructions when to return to ED, strict return precautions and importance of f/u.

## 2022-09-28 NOTE — ED PROVIDER NOTE - NSFOLLOWUPCLINICS_GEN_ALL_ED_FT
[address] [address]  Phone: [phone]  Fax:     [facility]  Cardiology  [address] [place] [address]  Phone: [phone]  Fax:     [facility]  Cardiology  [address] [facility], [address]  [place] [address]  Phone: [phone]  Fax:

## 2022-09-28 NOTE — ED ADULT NURSE NOTE - OBJECTIVE STATEMENT
AAOx3 c/o SOB x today morning s/p running out of inhaler yesterday. Denies trauma, fall, CP, NVD, HA or dizziness. Appears well with SPO2 of 100% on room air

## 2022-09-28 NOTE — ED ADULT NURSE NOTE - NSFALLRSKASSESASSIST_ED_ALL_ED
Relevant Problems   No relevant active problems       Anesthetic History   No history of anesthetic complications            Review of Systems / Medical History  Patient summary reviewed, nursing notes reviewed and pertinent labs reviewed    Pulmonary    COPD    Sleep apnea           Neuro/Psych         Psychiatric history     Cardiovascular    Hypertension        Dysrhythmias            GI/Hepatic/Renal                Endo/Other    Diabetes  Hypothyroidism  Morbid obesity and arthritis     Other Findings              Physical Exam    Airway  Mallampati: III  TM Distance: 4 - 6 cm  Neck ROM: decreased range of motion   Mouth opening: Diminished (comment)     Cardiovascular  Regular rate and rhythm,  S1 and S2 normal,  no murmur, click, rub, or gallop  Rhythm: regular  Rate: normal         Dental    Dentition: Edentulous     Pulmonary  Breath sounds clear to auscultation               Abdominal  GI exam deferred       Other Findings            Anesthetic Plan    ASA: 3  Anesthesia type: spinal      Post-op pain plan if not by surgeon: peripheral nerve block single    Induction: Intravenous  Anesthetic plan and risks discussed with: Patient      GA vs SAB discussed. Pt prefers SAB. Risks of spinal discussed including headache requiring epidural blood patch, back pain, and failed block discussed requiring conversion to GA. Procedure described as elective. Pt understands and desires to proceed. ACB explained for postop pain control. Risks explained including bleeding, infection, nerve injury, failed block. Procedure explained as elective. Pt understands and desires to proceed.
no

## 2022-09-28 NOTE — ED PROVIDER NOTE - CARE PROVIDER_API CALL
Kenny Roberts)  Critical Care Medicine; Internal Medicine; Pulmonary Disease  [address] [id number]  Phone: [phone]  Fax: [phone]  Follow Up Time:

## 2022-09-28 NOTE — ED PROVIDER NOTE - NS ED ROS FT
Gen: denies fever, chills, fatigue, weight loss  Skin: denies rashes  HEENT: denies visual changes, ear pain, nasal congestion, throat pain  Respiratory: +SOB. denies LAURA, SOB, cough, wheezing  Cardiovascular: +chest tightness. denies chest pain, palpitations, LE edema  GI: denies abdominal pain, n/v/d  : denies dysuria, frequency, hematuria  MSK: denies joint swelling/pain, back pain, neck pain  Neuro: +dizziness (Resolved) denies headache, LOC, weakness, numbness

## 2022-09-28 NOTE — ED ADULT TRIAGE NOTE - NS ED NURSE AMBULANCES
[facility] Information: Selecting Yes will display possible errors in your note based on the variables you have selected. This validation is only offered as a suggestion for you. PLEASE NOTE THAT THE VALIDATION TEXT WILL BE REMOVED WHEN YOU FINALIZE YOUR NOTE. IF YOU WANT TO FAX A PRELIMINARY NOTE YOU WILL NEED TO TOGGLE THIS TO 'NO' IF YOU DO NOT WANT IT IN YOUR FAXED NOTE.

## 2022-09-28 NOTE — ED PROVIDER NOTE - NSFOLLOWUPINSTRUCTIONS_ED_ALL_ED_FT
- Please follow-up with your primary care doctor in the next 1-2 days.  Please call tomorrow for an appointment.  If you cannot follow-up with your primary care doctor please return to the ED for any urgent issues.  - Follow up with the cardiologist within 1-2 days.   - Use the albuterol inhaler as directed.   - You were given a copy of the tests performed today.  Please bring the results with you and review them with your primary care doctor.  - If you have any worsening of symptoms or any other concerns please return to the ED immediately.  - Please continue taking your home medications as directed.     Asthma    Asthma is a condition in which the airways tighten and narrow, making it difficult to breath. Asthma episodes, also called asthma attacks, range from minor to life-threatening. Symptoms include wheezing, coughing, chest tightness, or shortness of breath. The diagnosis of asthma is made by a review of your medical history and a physical exam, but may involve additional testing. Asthma cannot be cured, but medicines and lifestyle changes can help control it. Avoid triggers of asthma which may include animal dander, pollen, mold, smoke, air pollutants, etc.     SEEK IMMEDIATE MEDICAL CARE IF YOU HAVE ANY OF THE FOLLOWING SYMPTOMS: worsening of symptoms, shortness of breath at rest, chest pain, bluish discoloration to lips or fingertips, lightheadedness/dizziness, or fever.     Hypertension    Hypertension, commonly called high blood pressure, is when the force of blood pumping through your arteries is too strong. Hypertension forces your heart to work harder to pump blood. Your arteries may become narrow or stiff. Having untreated or uncontrolled hypertension for a long period of time can cause heart attack, stroke, kidney disease, and other problems. If started on a medication, take exactly as prescribed by your health care professional. Maintain a healthy lifestyle and follow up with your primary care physician.    SEEK IMMEDIATE MEDICAL CARE IF YOU HAVE ANY OF THE FOLLOWING SYMPTOMS: severe headache, confusion, chest pain, abdominal pain, vomiting, or shortness of breath. - Please follow-up with your primary care doctor in the next 1-2 days.  Please call tomorrow for an appointment.  If you cannot follow-up with your primary care doctor please return to the ED for any urgent issues.  - Follow up with the cardiologist within 1-2 days.   - Follow up with the pulmonologist within 1-2 days  - Use the albuterol inhaler as directed.   - You were given a copy of the tests performed today.  Please bring the results with you and review them with your primary care doctor.  - If you have any worsening of symptoms or any other concerns please return to the ED immediately.  - Please continue taking your home medications as directed.     Asthma    Asthma is a condition in which the airways tighten and narrow, making it difficult to breath. Asthma episodes, also called asthma attacks, range from minor to life-threatening. Symptoms include wheezing, coughing, chest tightness, or shortness of breath. The diagnosis of asthma is made by a review of your medical history and a physical exam, but may involve additional testing. Asthma cannot be cured, but medicines and lifestyle changes can help control it. Avoid triggers of asthma which may include animal dander, pollen, mold, smoke, air pollutants, etc.     SEEK IMMEDIATE MEDICAL CARE IF YOU HAVE ANY OF THE FOLLOWING SYMPTOMS: worsening of symptoms, shortness of breath at rest, chest pain, bluish discoloration to lips or fingertips, lightheadedness/dizziness, or fever.     Hypertension    Hypertension, commonly called high blood pressure, is when the force of blood pumping through your arteries is too strong. Hypertension forces your heart to work harder to pump blood. Your arteries may become narrow or stiff. Having untreated or uncontrolled hypertension for a long period of time can cause heart attack, stroke, kidney disease, and other problems. If started on a medication, take exactly as prescribed by your health care professional. Maintain a healthy lifestyle and follow up with your primary care physician.    SEEK IMMEDIATE MEDICAL CARE IF YOU HAVE ANY OF THE FOLLOWING SYMPTOMS: severe headache, confusion, chest pain, abdominal pain, vomiting, or shortness of breath.

## 2023-03-04 ENCOUNTER — EMERGENCY (EMERGENCY)
Facility: HOSPITAL | Age: 43
LOS: 1 days | Discharge: HOME | End: 2023-03-04
Attending: STUDENT IN AN ORGANIZED HEALTH CARE EDUCATION/TRAINING PROGRAM
Payer: MEDICAID

## 2023-03-04 VITALS
OXYGEN SATURATION: 97 % | RESPIRATION RATE: 16 BRPM | HEART RATE: 105 BPM | DIASTOLIC BLOOD PRESSURE: 112 MMHG | HEIGHT: 61 IN | WEIGHT: 126.99 LBS | SYSTOLIC BLOOD PRESSURE: 173 MMHG | TEMPERATURE: 98 F

## 2023-03-04 PROCEDURE — 73630 X-RAY EXAM OF FOOT: CPT

## 2023-03-04 PROCEDURE — 99284 EMERGENCY DEPT VISIT MOD MDM: CPT

## 2023-03-04 PROCEDURE — 93971 EXTREMITY STUDY: CPT

## 2023-03-04 PROCEDURE — 93971 EXTREMITY STUDY: CPT | Mod: 26,LT

## 2023-03-04 PROCEDURE — 73630 X-RAY EXAM OF FOOT: CPT | Mod: 26,LT

## 2023-03-04 NOTE — ED ADULT NURSE NOTE - OBJECTIVE STATEMENT
Pt A&Ox4, NAD. Pt presents to the ED with complaints of left foot pain x1 day. Breathing even and unlabored.

## 2023-03-04 NOTE — ED PROVIDER NOTE - NSFOLLOWUPINSTRUCTIONS_ED_ALL_ED_FT
Foot Pain      Many things can cause foot pain. Some common causes are:  •An injury.      •A sprain.      •Arthritis.      •Blisters.      •Bunions.        Follow these instructions at home:      Managing pain, stiffness, and swelling   Bag of ice on a towel on the skin.   If directed, put ice on the painful area:  •Put ice in a plastic bag.      •Place a towel between your skin and the bag.      •Leave the ice on for 20 minutes, 2–3 times a day.      Activity     • Do not stand or walk for long periods.      •Return to your normal activities as told by your health care provider. Ask your health care provider what activities are safe for you.      •Do stretches to relieve foot pain and stiffness as told by your health care provider.      • Do not lift anything that is heavier than 10 lb (4.5 kg), or the limit that you are told, until your health care provider says that it is safe. Lifting a lot of weight can put added pressure on your feet.      Lifestyle     •Wear comfortable, supportive shoes that fit you well. Do not wear high heels.      •Keep your feet clean and dry.      General instructions     •Take over-the-counter and prescription medicines only as told by your health care provider.      •Rub your foot gently.      •Pay attention to any changes in your symptoms.      •Keep all follow-up visits as told by your health care provider. This is important.        Contact a health care provider if:    •Your pain does not get better after a few days of self-care.      •Your pain gets worse.      •You cannot stand on your foot.        Get help right away if:    •Your foot is numb or tingling.      •Your foot or toes are swollen.      •Your foot or toes turn white or blue.      •You have warmth and redness along your foot.        Summary    •Common causes of foot pain are injury, sprain, arthritis, blisters, or bunions.      •Ice, medicines, and comfortable shoes may help foot pain.      •Contact your health care provider if your pain does not get better after a few days of self-care.

## 2023-03-04 NOTE — ED PROVIDER NOTE - PATIENT PORTAL LINK FT
You can access the FollowMyHealth Patient Portal offered by [facility] by registering at the following website: [email or web address]. By joining [facility]’s FollowMyHealth portal, you will also be able to view your health information using other applications (apps) compatible with our system.

## 2023-03-04 NOTE — ED PROVIDER NOTE - PHYSICAL EXAMINATION
Const: Awake, alert and oriented. In no acute distress. Well appearing.  HEENT: NC/AT. Moist mucous membranes.  Eyes: No scleral icterus. EOMI.  Neck:. Soft and supple. Full ROM without pain.  Cardiac: +S1/S2. No murmurs. Peripheral pulses 2+ and symmetric. No LE edema.  Resp: Speaking in full sentences. No evidence of respiratory distress. No wheezes, rales or rhonchi.  Abd: Soft, non-tender, non-distended. Normal bowel sounds in all 4 quadrants. No guarding or rebound.  Back: Spine midline and non-tender. No CVAT.  MSK: Tenderness over dorsum of left foot on palpation, left calf on palpation FROM in all extremities neurovasculary intact DP palpable   Skin: No rashes, abrasions or lacerations.  Lymph: No cervical lymphadenopathy.  Neuro: Awake, alert & oriented x 3. Moves all extremities symmetrically.

## 2023-03-04 NOTE — ED ADULT TRIAGE NOTE - CHIEF COMPLAINT QUOTE
reports pain and swelling to left foot since last night. denies trauma. reports history of neuropathy. reports recently received acupuncture to b/l le for neuropathy.

## 2023-03-04 NOTE — ED PROVIDER NOTE - OBJECTIVE STATEMENT
pt is a 44 y/o female presenting to the ed for evaluation of left foot pain/swelling since this morning. pt states has hx of peripheral neuropathy but no history of diabetes is on gabapentin. pt denies injuries or trauma to the area. pt with no hx of dvt or pe no recent surgeries or travel  pt denies cp sob headache neck pain visual changes abd pain nausea vomiting numbness or loss of sensation abrasions or lacerations

## 2023-03-04 NOTE — ED PROVIDER NOTE - CARE PROVIDER_API CALL
Ted Márquez (DPM)  Podiatric Medicine and Surgery  [address] [place] [address]  Phone: [phone]  Fax: [phone]  Follow Up Time:

## 2023-09-20 NOTE — ED ADULT NURSE NOTE - TEMPLATE
HISTORY OF PRESENT ILLNESS  Tammi Childs is a 57 y.o. female     HPI ESTABLISHED patient here for follow up appointment. She complains of LEFT breast pain. She said that the surgery pain had subsided for about a week until last night all the sudden it started again and that it is worse then it had been. She describes it as a spontaneous stabbing pain. Denies any breast changes. Breast history    Referring - Dr. Afia Jung - [place] [address]  7/20/2022 - BSmammogram 3D at [place] [address] - showed a 1cm irregular mass in the LEFT breast at 3 o'clock position. 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 9/27/22 - port placement - Dr. Bard Esparza chemotherapy - Dr. Sherri Dubon    6/27/23-LEFT BREAST Marina Brace (6/19, 1030) LUMPECTOMY LEFT BREAST SENTINEL NODE BIOPSY WITH MAGTRACE INJECTION PORT REMOVAL    Review of Systems   All other systems reviewed and are negative. Physical Exam  Vitals and nursing note reviewed. Chest:   Breasts:     Right: No swelling, bleeding, inverted nipple, mass, nipple discharge, skin change or tenderness. Left: Tenderness present. No swelling, bleeding, inverted nipple, mass, nipple discharge or skin change. Comments: Xrt changes left breast   Lymphadenopathy:      Upper Body:      Right upper body: No axillary adenopathy. Left upper body: No axillary adenopathy. ASSESSMENT and PLAN   Diagnosis Orders   1. 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
Neuro

## 2024-03-18 PROBLEM — Z00.00 ENCOUNTER FOR PREVENTIVE HEALTH EXAMINATION: Status: ACTIVE | Noted: 2024-03-18

## 2024-10-31 NOTE — ED PROVIDER NOTE - CLINICAL SUMMARY MEDICAL DECISION MAKING FREE TEXT BOX
Pt transferred to bathroom without need of assistance. Gait remains steady. Proper footwear in place   pt is a 44 y/o female presenting to the ed for evaluation of left foot pain/swelling since this morning. pt states has hx of peripheral neuropathy but no history of diabetes is on gabapentin. pt denies injuries or trauma to the area. pt with no hx of dvt or pe no recent surgeries or travel, ultrasound duplex and xray negative pt to take tylenol and ibuprofen as needed

## 2024-12-14 ENCOUNTER — EMERGENCY (EMERGENCY)
Facility: HOSPITAL | Age: 44
LOS: 1 days | Discharge: HOME | End: 2024-12-14
Attending: EMERGENCY MEDICINE
Payer: MEDICAID

## 2024-12-14 VITALS
HEIGHT: 59 IN | TEMPERATURE: 97 F | DIASTOLIC BLOOD PRESSURE: 100 MMHG | OXYGEN SATURATION: 100 % | RESPIRATION RATE: 20 BRPM | HEART RATE: 103 BPM | SYSTOLIC BLOOD PRESSURE: 147 MMHG | WEIGHT: 110.01 LBS

## 2024-12-14 LAB
FLUAV AG NPH QL: SIGNIFICANT CHANGE UP
FLUBV AG NPH QL: SIGNIFICANT CHANGE UP
RSV RNA NPH QL NAA+NON-PROBE: SIGNIFICANT CHANGE UP
SARS-COV-2 RNA SPEC QL NAA+PROBE: SIGNIFICANT CHANGE UP

## 2024-12-14 PROCEDURE — 71045 X-RAY EXAM CHEST 1 VIEW: CPT

## 2024-12-14 PROCEDURE — 99284 EMERGENCY DEPT VISIT MOD MDM: CPT | Mod: 25

## 2024-12-14 PROCEDURE — 99284 EMERGENCY DEPT VISIT MOD MDM: CPT

## 2024-12-14 PROCEDURE — 71045 X-RAY EXAM CHEST 1 VIEW: CPT | Mod: 26

## 2024-12-14 PROCEDURE — 94640 AIRWAY INHALATION TREATMENT: CPT

## 2024-12-14 PROCEDURE — 87637 SARSCOV2&INF A&B&RSV AMP PRB: CPT

## 2024-12-14 RX ORDER — GABAPENTIN 400 MG/1
1 CAPSULE ORAL
Qty: 10 | Refills: 0
Start: 2024-12-14 | End: 2024-12-18

## 2024-12-14 RX ORDER — IPRATROPIUM BROMIDE AND ALBUTEROL SULFATE .5; 2.5 MG/3ML; MG/3ML
3 SOLUTION RESPIRATORY (INHALATION) ONCE
Refills: 0 | Status: COMPLETED | OUTPATIENT
Start: 2024-12-14 | End: 2024-12-14

## 2024-12-14 RX ORDER — PREDNISONE 20 MG/1
40 TABLET ORAL ONCE
Refills: 0 | Status: COMPLETED | OUTPATIENT
Start: 2024-12-14 | End: 2024-12-14

## 2024-12-14 RX ORDER — PREDNISONE 20 MG/1
2 TABLET ORAL
Qty: 10 | Refills: 0
Start: 2024-12-14 | End: 2024-12-18

## 2024-12-14 RX ADMIN — PREDNISONE 40 MILLIGRAM(S): 20 TABLET ORAL at 20:28

## 2024-12-14 RX ADMIN — IPRATROPIUM BROMIDE AND ALBUTEROL SULFATE 3 MILLILITER(S): .5; 2.5 SOLUTION RESPIRATORY (INHALATION) at 20:28

## 2024-12-16 RX ORDER — AMOXICILLIN AND CLAVULANATE POTASSIUM 500; 125 MG/1; MG/1
1 TABLET, FILM COATED ORAL
Qty: 14 | Refills: 0
Start: 2024-12-16 | End: 2024-12-22

## 2024-12-19 ENCOUNTER — APPOINTMENT (OUTPATIENT)
Dept: PSYCHIATRY | Facility: CLINIC | Age: 44
End: 2024-12-19

## 2025-01-03 ENCOUNTER — APPOINTMENT (OUTPATIENT)
Dept: PSYCHIATRY | Facility: CLINIC | Age: 45
End: 2025-01-03

## 2025-01-10 ENCOUNTER — APPOINTMENT (OUTPATIENT)
Dept: PSYCHIATRY | Facility: CLINIC | Age: 45
End: 2025-01-10

## 2025-01-23 NOTE — ED STATDOCS - NSFOLLOWUPINSTRUCTIONS_ED_ALL_ED_FT
[id number] USE MOTRIN AND TYLENOL AS NEEDED FOR HEADACHES. RETURN TO ED IF SYMPTOMS WORSEN.     Acute Headache    WHAT YOU NEED TO KNOW:    An acute headache is pain or discomfort that starts suddenly and gets worse quickly. You may have an acute headache only when you feel stress or eat certain foods. Other acute headache pain can happen every day, and sometimes several times a day.     DISCHARGE INSTRUCTIONS:    Return to the emergency department if:     You have severe pain.      You have numbness or weakness on one side of your face or body.      You have a headache that occurs after a blow to the head, a fall, or other trauma.       You have a headache, are forgetful or confused, or have trouble speaking.      You have a headache, stiff neck, and a fever.    Contact your healthcare provider if:     You have a constant headache and are vomiting.      You have a headache each day that does not get better, even after treatment.      You have changes in your headaches, or new symptoms that occur when you have a headache.      You have questions or concerns about your condition or care.    Medicines: You may need any of the following:     Prescription pain medicine may be given. The medicine your healthcare provider recommends will depend on the kind of headaches you have. You will need to take prescription headache medicines as directed to prevent a problem called rebound headache. These headaches happen with regular use of pain relievers for headache disorders.      NSAIDs, such as ibuprofen, help decrease swelling, pain, and fever. This medicine is available with or without a doctor's order. NSAIDs can cause stomach bleeding or kidney problems in certain people. If you take blood thinner medicine, always ask your healthcare provider if NSAIDs are safe for you. Always read the medicine label and follow directions.      Acetaminophen decreases pain and fever. It is available without a doctor's order. Ask how much to take and how often to take it. Follow directions. Read the labels of all other medicines you are using to see if they also contain acetaminophen, or ask your doctor or pharmacist. Acetaminophen can cause liver damage if not taken correctly. Do not use more than 3 grams (3,000 milligrams) total of acetaminophen in one day.       Antidepressants may be given for some kinds of headaches.       Take your medicine as directed. Contact your healthcare provider if you think your medicine is not helping or if you have side effects. Tell him or her if you are allergic to any medicine. Keep a list of the medicines, vitamins, and herbs you take. Include the amounts, and when and why you take them. Bring the list or the pill bottles to follow-up visits. Carry your medicine list with you in case of an emergency.    Manage your symptoms:     Apply heat or ice on the headache area. Use a heat or ice pack. For an ice pack, you can also put crushed ice in a plastic bag. Cover the pack or bag with a towel before you apply it to your skin. Ice and heat both help decrease pain, and heat also helps decrease muscle spasms. Apply heat for 20 to 30 minutes every 2 hours. Apply ice for 15 to 20 minutes every hour. Apply heat or ice for as long and for as many days as directed. You may alternate heat and ice.      Relax your muscles. Lie down in a comfortable position and close your eyes. Relax your muscles slowly. Start at your toes and work your way up your body.      Keep a record of your headaches. Write down when your headaches start and stop. Include your symptoms and what you were doing when the headache began. Record what you ate or drank for 24 hours before the headache started. Describe the pain and where it hurts. Keep track of what you did to treat your headache and if it worked.     Prevent an acute headache:     Avoid anything that triggers an acute headache. Examples include exposure to chemicals, going to high altitude, or not getting enough sleep. Create a regular sleep routine. Go to sleep at the same time and wake up at the same time each day. Do not use electronic devices before bedtime. These may trigger a headache or prevent you from sleeping well.      Do not smoke. Nicotine and other chemicals in cigarettes and cigars can trigger an acute headache or make it worse. Ask your healthcare provider for information if you currently smoke and need help to quit. E-cigarettes or smokeless tobacco still contain nicotine. Talk to your healthcare provider before you use these products.       Limit alcohol as directed. Alcohol can trigger an acute headache or make it worse. If you have cluster headaches, do not drink alcohol during an episode. For other types of headaches, ask your healthcare provider if it is safe for you to drink alcohol. Ask how much is safe for you to drink, and how often.      Exercise as directed. Exercise can reduce tension and help with headache pain. Aim for 30 minutes of physical activity on most days of the week. Your healthcare provider can help you create an exercise plan.      Eat a variety of healthy foods. Healthy foods include fruits, vegetables, low-fat dairy products, lean meats, fish, whole grains, and cooked beans. Your healthcare provider or dietitian can help you create meals plans if you need to avoid foods that trigger headaches.    Follow up with your healthcare provider as directed: Bring your headache record with you when you see your healthcare provider. Write down your questions so you remember to ask them during your visits.

## 2025-07-06 NOTE — ED PROVIDER NOTE - CARDIOVASCULAR [-], MLM
Pt ambulated to bathroom, no balance issues observed, successful and appropriate walking mobility.    no chest pain/no palpitations/no peripheral edema/no syncope